# Patient Record
Sex: FEMALE | Race: WHITE
[De-identification: names, ages, dates, MRNs, and addresses within clinical notes are randomized per-mention and may not be internally consistent; named-entity substitution may affect disease eponyms.]

---

## 2019-04-30 ENCOUNTER — HOSPITAL ENCOUNTER (OUTPATIENT)
Dept: HOSPITAL 46 - OPS | Age: 59
Discharge: HOME | End: 2019-04-30
Attending: OTOLARYNGOLOGY
Payer: MEDICARE

## 2019-04-30 VITALS — HEIGHT: 64 IN | WEIGHT: 181.44 LBS | BODY MASS INDEX: 30.98 KG/M2

## 2019-04-30 DIAGNOSIS — E78.5: ICD-10-CM

## 2019-04-30 DIAGNOSIS — Z88.8: ICD-10-CM

## 2019-04-30 DIAGNOSIS — Z79.01: ICD-10-CM

## 2019-04-30 DIAGNOSIS — J34.3: ICD-10-CM

## 2019-04-30 DIAGNOSIS — Z88.1: ICD-10-CM

## 2019-04-30 DIAGNOSIS — F17.210: ICD-10-CM

## 2019-04-30 DIAGNOSIS — I10: ICD-10-CM

## 2019-04-30 DIAGNOSIS — J34.2: Primary | ICD-10-CM

## 2019-04-30 DIAGNOSIS — Z79.899: ICD-10-CM

## 2019-04-30 DIAGNOSIS — Z88.7: ICD-10-CM

## 2019-04-30 DIAGNOSIS — Z88.0: ICD-10-CM

## 2019-04-30 DIAGNOSIS — J44.9: ICD-10-CM

## 2019-04-30 PROCEDURE — 09BM0ZZ EXCISION OF NASAL SEPTUM, OPEN APPROACH: ICD-10-PCS | Performed by: OTOLARYNGOLOGY

## 2019-04-30 NOTE — OR
[facility]
                                    [address], [place]  [address]
_________________________________________________________________________________________
                                                                 Draft    
 
 
DATE OF OPERATION:
04/30/2019
 
SURGEON:
Ravindra Peters MD
 
PREOPERATIVE DIAGNOSIS:
Septal deformity with inferior turbinate hypertrophy.
 
POSTOPERATIVE DIAGNOSIS:
Septal deformity with inferior turbinate hypertrophy.
 
PROCEDURE:
Septoplasty, cautery the inferior turbinates, bilateral submucosal.
 
ANESTHESIA:
General LMA.  Piero WALLACE.
 
PREOPERATIVE HISTORY:
Amalia is a 59-year-old lady with a long history of nasal obstruction, drainage,
evidence of a septal deformity and inferior turbinate hypertrophy.  In the office,
unresponsive to appropriate medications and she is taken to the operating for the
above-mentioned procedures. 
 
OPERATIVE PROCEDURE AND FINDINGS:
After informed consent, the patient was taken to the operating room, placed in the
supine position where general LMA anesthesia was induced.  The patient and procedure
were verified.  The patient received preoperative intravenous Ancef and intranasal
oxymetazoline.  Headlight nasal speculum exam of the nasal cavity showed a septal
deformity, right side, obstructive large spur.  This spur was injected with 1% lidocaine
with Epi and the spur was excised with Qasim.  Airway was improved in this manner.
The septum was medialized also with a speculum. 
 
The inferior turbinates were then cauterized with a long handle needle point cautery,
multiple passes starting on the right side on the medial and inferior surface of the
inferior turbinate extending from the most anterior portion all the way back posterior.
Good decongestion of the turbinate.  Improvement in the airway.  Same procedure on the
left inferior turbinate.  There was minimal bleeding, packing was placed.  Trimmed
Merocel equal amount on each side, coated with Neosporin.  One piece each side tied
anteriorly over a pad.  The pharynx was suctioned of clear blood secretions.  The
patient was then awakened, extubated, transported to recovery room in good condition.
No complications. 
 
                                                                                    
_________________________________________________________________________________________
PATIENT NAME:     AMALIA WHITAKER                         
MEDICAL RECORD #: [id number]            OPERATIVE REPORT              
          ACCT #: [id number]  
DATE OF BIRTH:   [birth year]            REPORT #: [id number]      
PHYSICIAN:        RAVINDRA PETERS MD              
PCP:              JOJO BALL MD              
REPORT IS CONFIDENTIAL AND NOT TO BE RELEASED WITHOUT AUTHORIZATION
 
 
                                  [address]  [address]
_________________________________________________________________________________________
                                                                 Draft    
 
 
 
BLOOD LOSS:
Minimal.
 
SPECIMEN:
No specimen.
 
DRAINS:
No drains.
 
PACKING:
One piece of Merocel, each nostril.
 
 
 
            ________________________________________
            Ravindra Peters MD 
 
 
GC/MODL
Job #:  [id number]
DD:  04/30/2019 11:04:19
DT:  04/30/2019 18:49:59
 
 
Copies:                                
~
 
 
 
 
 
 
 
 
 
 
 
 
 
 
 
 
                                                                                    
_________________________________________________________________________________________
PATIENT NAME:     AMALIA WHITAKER                         
MEDICAL RECORD #: [id number]            OPERATIVE REPORT              
          ACCT #: [id number]  
DATE OF BIRTH:   [birth year]            REPORT #: [id number]      
PHYSICIAN:        RAVINDRA PETERS MD              
PCP:              JOJO BALL MD              
REPORT IS CONFIDENTIAL AND NOT TO BE RELEASED WITHOUT AUTHORIZATION

## 2019-04-30 NOTE — NUR
04/30/19 1120 Sheets,Donna
1058 PT ARRIVED TO PACU ASLEEP WITH ORAL AIRWAY IN PLACE, SMALL UPPER
WHEEZE NOTED WHILE ASLEEP. O2 % ON 6L VIA MASK. RESP EVEN AND
UNLABORED.

## 2025-01-03 NOTE — NUR
PEREZ ARRIVED TO THE [facility] BED REQUIRING 2 LITERS OF O2 TO MAINTAIN O2
SATS ABOVE 92%.  SHE IS ALERT AND ORIENTED X4, MOVES ALL EXTREMITIES, DENIES
PAIN, AND ABLE TO ANSWER ALL ASSESSMENT QUESTIONS.
 
NEURO- PERRL, MOVES ALL EXTREMITIES, DENIES PAIN, STANDBY ASSIST TO RESTROOM
 
CARDIAC- AFIB WITH RVR, DILTIAZEM GTT CURRENTLY AT 10MG/HR, AFEBRILE, NO
EDEMA, PALPABLE PULSES, NORMOTENSIVE
 
RESP- EXP AND INSPIRATORY WHEEZES, 2L 02, PRODUCTIVE COUGH, CLEAR,WHITE SPUTUM
 
GI/- MODERATELY DISTENDED ABDOMEN, LAST BM 01/02/25, UP TO RESTROOM TO VOID,
2G NA DIET, H20 PROVIDED
 
INT- SEE ASSESSMENT
 
SOCIAL- SOME ANXIETY DUE TO SPOUSE BEING ILL ON HOSPICE
 
LDA- 20G L AC, R HAND PATENT AND INTACT

## 2025-01-03 NOTE — NUR
Texted Dr. Kirby and requested orders for home 02 qual. Orders received but
asked to order per Dr. Balderrama as he is taking over. Elizabeth in RT notified.

## 2025-01-03 NOTE — NUR
pt did not like dinner - rn provided turkey sand, pudding and apple sauce. pt
seemed confused on what to do with sandwich - rt yury and this rn in room and
pt said well what do i do with this?  She could not figure out what the jono
and indira were for?  when asked what she likes on a sandwich she said well i
dont know. pt asked what she likes on her sandwich at home. she could not give
answer - i told her I like jono, she said ok then I will have that. rt yury
thought that maybe the pt was confused with sob- rn wondered about night time
sun downers?

## 2025-01-03 NOTE — NUR
FACESHEET, NOTES, ORDERS, HOME OXYGEN QUALIFIER FAXED TO [facility] FOR PROBABLE
DC TO HOME TOMORROW.

## 2025-01-03 NOTE — NUR
PATIENT PEREZ CALLED AND REPORTED THAT SHE FELT COLD.  ROOM TEMPERATURE
INCREASED, WARM BLANKETS, AND CRANBERRY JUICE PROVIDED.  NO OTHER NEEDS
REPORTED AT THIS TIME

## 2025-01-03 NOTE — NUR
pt up in chair - printed education on new diltiazem po reviewed and given to
pt. hr 67, 1l nc oxygen 93%. call light in reach.

## 2025-01-03 NOTE — NUR
UR CLINICAL REVIEW:
[facility]MN [facility]ERSALUS, MEETS INPT FOR COPD EXACERBATION AND AFIB RVR
MEDICARE
INPT 01/02/25 @ 2225
ORDER MATCHES REG
NO AUTH REQUIRED PER MEDICARE RULES
DC TO HOME WHEN MEDICALLY STABLE, PENDING FURTHER EVAL.

## 2025-01-03 NOTE — NUR
PEREZ IS RESTING WITH EYES CLOSED.  AFIB WITH CONTROLLED RATE 94-98.
DILTIAZEM GTT AT 15MG/HR.  NO NEEDS IDENTIFIED AT THIS TIME

## 2025-01-03 NOTE — NUR
PEREZ IS RESTING IN BED WITH EYES CLOSED.  SHE APPEARS COMFORTABLE WITH NO
SIGNS OF DISTRESS.  DILTIAZEM GTT REMAINS AT 10MG/HR.  PERSISTENT AFIB

## 2025-01-03 NOTE — NUR
pt amb to br with standby assist, room air trial - desat to 85% room air sob
with exertion. void 300 ml - returned o2 nc sats 94% at rest sitting in chair.
call light in reach.

## 2025-01-03 NOTE — NUR
pt up in chair for meal - lungs bilat ar extremly wheezy, pt talkative, po
tylenol and cough syrup given for non productive cough with discomfort and pt
using IS/resp devices. enc tcdb. all am medications educated to pt, call light
in reach.

## 2025-01-03 NOTE — EKG
[facility]
                                    [address]
                                  [place], [place]  [address]
_________________________________________________________________________________________
                                                                 Signed   
 
 
Atrial fibrillation with rapid ventricular response
Rightward axis
ST \T\ T wave abnormality, consider inferolateral ischemia Abnormal ECG When compared
with ECG of 02-JAN-2025 18:30, (Unconfirmed)
Significant changes have occurred
Confirmed by Lyn Rod DO ([id number]) on 1/3/2025 11:53:04 AM
 
 
 
 
 
 
 
 
 
 
 
 
 
 
 
 
 
 
 
 
 
 
 
 
 
 
 
 
 
 
 
 
 
 
 
 
 
 
 
    Electronically Signed By: LYN ROD DO  01/03/25 1153
_________________________________________________________________________________________
PATIENT NAME:     PEREZ WHITAKER                         
MEDICAL RECORD #: [id number]                     Electrocardiogram             
          ACCT #: [id number]  
DATE OF BIRTH:   [birth year]                                       
PHYSICIAN:   LYN ROD DO                     REPORT #: [id number]
REPORT IS CONFIDENTIAL AND NOT TO BE RELEASED WITHOUT AUTHORIZATION

## 2025-01-03 NOTE — EKG
[facility]
                                    [address]
                                  [place]  [address]
_________________________________________________________________________________________
                                                                 Signed   
 
 
Sinus rhythm with premature supraventricular complexes
Nonspecific T wave abnormality
Abnormal ECG
When compared with ECG of 02-AUG-2023 09:51,
Nonspecific T wave abnormality now evident in Inferior leads
Nonspecific T wave abnormality, worse in Lateral leads
Confirmed by Lyn Rod DO ([id number]) on 1/3/2025 11:52:54 AM
 
 
 
 
 
 
 
 
 
 
 
 
 
 
 
 
 
 
 
 
 
 
 
 
 
 
 
 
 
 
 
 
 
 
 
 
 
 
    Electronically Signed By: LYN ROD DO  01/03/25 1153
_________________________________________________________________________________________
PATIENT NAME:     PEREZ WHITAKER                         
MEDICAL RECORD #: [id number]                     Electrocardiogram             
          ACCT #: [id number]  
DATE OF BIRTH:   [birth year]                                       
PHYSICIAN:   LYN ROD DO                     REPORT #: [id number]
REPORT IS CONFIDENTIAL AND NOT TO BE RELEASED WITHOUT AUTHORIZATION

## 2025-01-03 NOTE — NUR
PATIENT UP TO BSC WITH Ramah SUP ASSIST. PATIENT REPORTED SOB WITH ACTIVITY.
COUGH AND AUDIBLE EXP WHEEZE NOTED. RT CALLED FOR PRN NEB TREATMENT.

## 2025-01-03 NOTE — NUR
RN SPOKE WITH  ON PHONE, OK TO SL PT, I/O ARE GOOD. AWARE OF MEDICAL RECORDS
HER AND HX OF AA, AND PE.

## 2025-01-03 NOTE — NUR
in room with pt and this rn, pt reports feeling improved, oxygen 3l nc 97%
pt wants to go home tomorrow.

## 2025-01-03 NOTE — NUR
UP TP BSC. 175CC CLEAR/YELLOW URINE.  KERRI CARE COMPLETED AND BACK TO BED.
2LNC, AUDIBLE INSPIRATORY AND EXPIRATORY WHEEZES.  WARM BLANKETS PROVIDED.
SHE DENIES ANY PAIN, NEEDS, OR DISCOMFORTS TONIGHT.  LIGHTS DIMMED.
BEDSIDE TABLE, PERSONAL BELONGINGS, AND CALL LIGHT WITHIN REACH

## 2025-01-03 NOTE — NUR
pt up in chair, complains of left foot cramps, spasms like feeling as if foot
is asleep. started feeling about 2 days before admit. pt reports that dr lovett thought she had influenza to go to hospital.

## 2025-01-03 NOTE — NUR
PEREZ IS NOTED TO BE RESTING IN RECLINER.  SHE IS CONSUMING A VANILLA PUDDING
AND THEN WILL BE READY TO GET BACK IN BED.  RT ADRIANO GAVE A TREATMENT.  PEREZ
STATES THAT HER BREATHING TREAMENTS "SEEM TO HELP.  SHE STILL HAS A PRODUCTIVE
COUGH.  PRN MEDS GUAIFENESIN, TESSALON PERLE, AND MELATONIN GIVEN WITH PM
MEDS.  QUAD FLU TEST PERFORMED AND SENT TO LAB

## 2025-01-03 NOTE — NUR
pt anxious, sob- prn neb tx started by rn with steph in dept. pt enc to
purse lip breath. pt up to bsc void 300 ml. back to bed. recovered. sitting at
edge of bed for meal. call light in reach.

## 2025-01-03 NOTE — NUR
PEREZ CALLED AND REPORTED THAT SHE "COULD NOT BREATH".  SPO2 REMAINED ABOVE
94%.  SHE CONVEYED THAT SHE WAS WORRIED ABOUT SPOUSE AT HOME AND DOING
LAUNDRY.  PERSISTENT COUGH.  PRN GUAIFENSESIN, TESALON PERLE, AND MELATONIN
OFFERED AND ACCEPTED.  THERAPEUTIC CONVERSATION, WARM BLANKET, AND REASSURANCE
PROVIDED.

## 2025-01-03 NOTE — NUR
Spoke with Amalia. She lives in a home with a ramp. Denies needs. Does not use
any DME. 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 She wants to dc tomorrow and
will most likely need 02. She would like to use [employer]. She uses the food
bank in [place], but states she is over income for food stamps. Daughter
shops for her.

## 2025-01-03 NOTE — NUR
PEREZ VOIDED 300CC IN COMMODE.  URINE IS CLEAR AND JIHAN.  UP TO CHAIR EATING
A CHOCOLATE PUDDING, FRESH WATER, AND WATCHING TELEVISION.  SHE STATES THAT
SHE FEELS MUCH BETTER TODAY THAN YESTERDAY.  WHILE AMBULATING SHE CONVERTED
FROM AFIB TO NSR.  DILTIAZEM GTT LOWERED TO 5MG/HR.
 
NEURO- ALERT AND ORIENTED X 4, MOVES ALL EXTREMTIES, DENIES PAIN, PERRL
 
CARDIAC- CONVERT TO NSR AT 0623.  MD ROD MADE AWARE.  ANTICIPATING AN ORDER
FOR PO DILTIAZEM.  AFEBRILE, PALPABLE PULSES.
 
RESP- 2LNC, BREATH SOUNDS CLEAR/DIMINISHED, PRODUCTIVE COUGH
 
GI/- ENDORSING HUNGER AND ANTICIPATING BREAKFAST THIS AM. NORMOACTIVE BOWEL
TONES, VOIDS IN COMMODE.
 
PIV X 2 PATENT AND INTACT

## 2025-01-03 NOTE — NUR
PEREZ IS RESTING IN BED WITH EYES CLOSED.  QUAD RESPIRATORY PANEL CONFIRMED
REPORTED INFLUENZA A FROM DECEMEBER 27.  2L NC REQUIRED TO MAINTAIN O2 WDL

## 2025-01-04 NOTE — NUR
report from night shift, pt up in chair - rn brings in meal, pt breathing
better this shift. hr 67 call light in reach.

## 2025-01-04 NOTE — NUR
PEREZ HAD A RESTFUL NIGHT.  SHE SPOKE ABOUT FUTURE PLANS, GOALS, AND
MOTIVATIONS TO GET WELL.
 
NEURO- ALERT AND ORIENTED X4, MOVES ALL EXTREMITIES, DENIES PAIN, INTERMITTENT
ANXIETY, STANBY ASSIST TO COMMODE, ABLE TO EXPRESS ALL NEEDS AND WANTS, USES
CALL LIGHT APPROPRIATELY
 
CARDIAC- SB-SR WITH PVS, AFEBRILE, PALPABLE PULSES, NO EDEMA NOTED,
NORMOTENSIVE
 
RESP- 2L NC, BREATH SOUNDS DIMINISHED, INSPIRATORY AND EXPIRATORY WHEEZES,
EXERTION WITH ACTIVITY DECREASED FROM PREVIOUS 24 HOURS, PRODUCTIVE COUGH,
USING ACAPELLA
 
GI/- NORMOACTIVE BOWEL TONES, PASSING FLATUS, ENDORSES GOOD APPETITE,
ABDOMEN SOFT AND NON-TENDER, VOIDS IN COMMODE
 
INT- SEE ASSESMENT
 
LDA- PATENT AND INTACT

## 2025-01-04 NOTE — NUR
preparing for dc reviewed dc inst with pt. lili called and will deliver o2
to home. portable temporary tank here with pt and educated by rn and resp
yury. [facility] in with this rn to educate on all medications, written
and printed education given.

## 2025-01-04 NOTE — NUR
UP TO COMMODE TO VOID. 200CC YELLOW URINE ACOUNTED FOR.  PEREZ STATED THAT
SHE HAD "AN EPIPHANY" AND DESIRES TO STOP SMOKING.  SHE STATES THAT SHE WILL
DO SO "COLD TURKEY" AND DENIES NEED FOR EDUCATION OR COUNSELING.  BACK IN BED
RESTING WITH EYES CLOSED

## 2025-01-04 NOTE — NUR
UP TO COMMODE TO VOID.  200CC YELLOW URINE.  2L NC REQUIRED TO MAINTAIN O2
GREATER THAN 92%.  PEREZ DENIES ANY PAIN OR DISCOMFORT AT THIS TIME.  SHE
STATES THAT SHE FEELS "RESTED AND STRONGER"

## 2025-01-04 NOTE — NUR
UP TO COMMODE.  SOME DRIBBLING AND URGENCY NOTED.  200CC YELLOW URINE.  KERRI
CARE COMPLETED.  INSPIRATORY AND EXPRIATORY WHEEZES CONTINUE.  PEREZ WAS
ENCOURAGED TO USE THE ACAPELLA, COUGH, AND SPIT UP MUCOUS.  SHE COMPLIED AND
STATED SHE FELT BETTER.  BACK IN BED RESTING WITH EYES CLOSED.  NO OTHER NEEDS
IDENTIFIED AT THIS TIME

## 2025-01-04 NOTE — NUR
in room with  and RT cotton. pt up in chair - educated on nebs, meds, and
oxygen at home. pcp appt for tuesday. discussed education on coumadin, labs
and coumadin clinic.
pt agrees and would like to go home today. call light in reach.